# Patient Record
(demographics unavailable — no encounter records)

---

## 2025-01-30 NOTE — REASON FOR VISIT
[FreeTextEntry1] : 82 year old male presents for an initial Cardiology evaluation prior to proceeding with a right knee replacement on 2/28/2025

## 2025-01-30 NOTE — HISTORY OF PRESENT ILLNESS
[FreeTextEntry1] : Hypertension known for approximately 1 year but never started on antihypertensive therapy. History of bradycardia Patient denies a history of MI, angina, CHF, arrhythmia, TIA, CVA, syncope, DM, familial history of heart disease. Former cigarette smoker, patient quit 25 years ago and smoked for 30 years. PSurgHx: Bilateral shoulder operations more than 20 years ago,  Left sided hernia operation 40 years ago. Bunion surgery also many years ago. Nephrolithiasis and history of bladder cancer in 2019 Patient stated that he had a stress test performed over 20 years ago as part of routine testing. He was told that the test was normal

## 2025-01-30 NOTE — ASSESSMENT
[FreeTextEntry1] : 82 year old with hypertensive heart disease not on antihypertensive therapy Preoperative Cardiology risk stratification prior to right knee replacement surgery Sinus bradycardia

## 2025-01-30 NOTE — DISCUSSION/SUMMARY
[EKG obtained to assist in diagnosis and management of assessed problem(s)] : EKG obtained to assist in diagnosis and management of assessed problem(s) [FreeTextEntry1] : IV Lexiscan nuclear stress test TTE EKG: NSR rate of 55 bpm, nonspecific ST T changes Obtain a copy of the patient's recent lab results from his PCP. Start lisinopril 10 mg QD. Rx. sent to pharmacy. Home BP monitoring. I spoke with the patient's son and he will be monitoring the BP readings for his father and keeping a diary of the readings. Start a low fat, low cholesterol diet and low sodium diet. RV in 2 weeks after above testing.

## 2025-01-30 NOTE — PHYSICAL EXAM

## 2025-03-27 NOTE — ASSESSMENT
[FreeTextEntry1] : 81 yo with bladder cancer diagnosed 7/2019 - High grade, lamina propria invasive bladder cancer  he did not follow up after his re-resection 10/2019 (no tumor identified) he did not undergo surveillance cystoscopy no intravesical treatments  he is not on any prostate medications   [facility] 1/2025 RBCs - 3-10 RBCs Hgb 12.3 HgbA1c- 5.8 Cr 0.92 ng/ml   no recent imaging of the upper tracts or prostate  Plan 81 yo with prior hiatory of bladder cancer s/p TURBT 2019 - no follow up (failed to return for follow up) - reviewed prior pathology - urine studies - cystoscopy - renal and bladder US

## 2025-03-27 NOTE — LETTER BODY
[Dear  ___] : Dear  [unfilled], [Consult Letter:] : I had the pleasure of evaluating your patient, [unfilled]. [Please see my note below.] : Please see my note below. [Sincerely,] : Sincerely, [FreeTextEntry3] : Tony Blankenship MD, FACS

## 2025-03-27 NOTE — HISTORY OF PRESENT ILLNESS
[FreeTextEntry1] : 83 yo with bladder cancer diagnosed 7/2019 - High grade, lamina propria invasive bladder cancer  he did not follow up after his re-resection 10/2019 (no tumor identified) he did not undergo surveillance cystoscopy no intravesical treatments  he is not on any prostate medications   [facility] 1/2025 RBCs - 3-10 RBCs Hgb 12.3 HgbA1c- 5.8 Cr 0.92 ng/ml   no recent imaging of the upper tracts or prostate    [Urinary Retention] : no urinary retention [Urinary Urgency] : no urinary urgency [Urinary Frequency] : no urinary frequency [Straining] : no straining [Weak Stream] : no weak stream [Intermittency] : no intermittency [Dysuria] : no dysuria [Hematuria - Gross] : no gross hematuria [Hematuria - Microscopic] : microscopic hematuria

## 2025-06-04 NOTE — ASSESSMENT
[FreeTextEntry1] : 81 yo with bladder cancer diagnosed 7/2019 - High grade, lamina propria invasive bladder cancer  he did not follow up after his re-resection 10/2019 (no tumor identified) he did not undergo surveillance cystoscopy no intravesical treatments  he is not on any prostate medications   [facility] 1/2025 RBCs - 3-10 RBCs Hgb 12.3 HgbA1c- 5.8 Cr 0.92 ng/ml   renal and bladder US reviewed 5/1/2025 cystoscopy on 5/1/2025 noted a region suspicious for TCC s/p cystoscopy and TUR of the region - no malignancy identified 5/21/25 pathology reviewed with the patient and his son in law   Plan 81 yo with prior history of bladder cancer s/p TURBT 2019 - no follow up (failed to return for follow up) no recurrence noted on recent TURBT - f/u in 1 year for office cystoscopy all questions answered

## 2025-06-04 NOTE — HISTORY OF PRESENT ILLNESS
[FreeTextEntry1] : 83 yo with bladder cancer diagnosed 7/2019 - High grade, lamina propria invasive bladder cancer  he did not follow up after his re-resection 10/2019 (no tumor identified) he did not undergo surveillance cystoscopy no intravesical treatments  he is not on any prostate medications   [facility] 1/2025 RBCs - 3-10 RBCs Hgb 12.3 HgbA1c- 5.8 Cr 0.92 ng/ml   renal and bladder US reviewed 5/1/2025 cystoscopy on 5/1/2025 noted a region suspicious for TCC s/p cystoscopy and TUR of the region - no malignancy identified 5/21/25 pathology reviewed with the patient and his son in law      [Urinary Retention] : no urinary retention [Urinary Urgency] : no urinary urgency [Urinary Frequency] : no urinary frequency [Straining] : no straining [Weak Stream] : no weak stream [Intermittency] : no intermittency [Dysuria] : no dysuria [Hematuria - Gross] : no gross hematuria [Hematuria - Microscopic] : microscopic hematuria